# Patient Record
Sex: MALE | Race: WHITE | NOT HISPANIC OR LATINO | Employment: PART TIME | URBAN - METROPOLITAN AREA
[De-identification: names, ages, dates, MRNs, and addresses within clinical notes are randomized per-mention and may not be internally consistent; named-entity substitution may affect disease eponyms.]

---

## 2022-08-30 ENCOUNTER — HOSPITAL ENCOUNTER (EMERGENCY)
Facility: HOSPITAL | Age: 27
Discharge: HOME | End: 2022-08-30
Attending: EMERGENCY MEDICINE

## 2022-08-30 VITALS
TEMPERATURE: 98.5 F | RESPIRATION RATE: 18 BRPM | HEART RATE: 71 BPM | DIASTOLIC BLOOD PRESSURE: 74 MMHG | SYSTOLIC BLOOD PRESSURE: 136 MMHG | OXYGEN SATURATION: 97 %

## 2022-08-30 DIAGNOSIS — L03.90 CELLULITIS: Primary | ICD-10-CM

## 2022-08-30 PROCEDURE — 99282 EMERGENCY DEPT VISIT SF MDM: CPT

## 2022-08-30 PROCEDURE — 99284 EMERGENCY DEPT VISIT MOD MDM: CPT | Performed by: EMERGENCY MEDICINE

## 2022-08-30 RX ORDER — CLINDAMYCIN HYDROCHLORIDE 150 MG/1
450 CAPSULE ORAL 3 TIMES DAILY
Qty: 63 CAPSULE | Refills: 0 | Status: SHIPPED | OUTPATIENT
Start: 2022-08-30 | End: 2022-09-06

## 2022-08-30 RX ORDER — CLINDAMYCIN HYDROCHLORIDE 150 MG/1
300 CAPSULE ORAL ONCE
Status: DISCONTINUED | OUTPATIENT
Start: 2022-08-30 | End: 2022-08-30

## 2022-08-30 NOTE — ED PROVIDER NOTES
History  Chief Complaint   Patient presents with    Leg Swelling     Pt reports swelling and redness to the right lower leg x3 days      24-year-old male presenting with right-sided anterior leg pain, redness over the last 3 days  Patient states gradual worsening of his symptoms  Patient had similar issue back in February and was diagnosed with cellulitis  His symptoms resolved after taken clindamycin, and he was asymptomatic until today  Patient reports at that time diagnosis he had in his negative ultrasound to rule out a DVT at that time  He denies history of DVT, PE, family history of such, risk factors  Rash  Location:  Leg  Leg rash location:  R lower leg  Quality: painful and redness    Pain details:     Severity:  Mild    Onset quality:  Gradual    Timing:  Constant    Progression:  Worsening  Severity:  Moderate  Onset quality:  Gradual  Timing:  Constant  Progression:  Worsening  Chronicity:  Recurrent  Associated symptoms: no abdominal pain, no diarrhea, no fever, no nausea, no sore throat, not vomiting and not wheezing        None       History reviewed  No pertinent past medical history  History reviewed  No pertinent surgical history  History reviewed  No pertinent family history  I have reviewed and agree with the history as documented  E-Cigarette/Vaping     E-Cigarette/Vaping Substances     Social History     Tobacco Use    Smoking status: Never Smoker    Smokeless tobacco: Never Used   Substance Use Topics    Alcohol use: Never    Drug use: Never       Review of Systems   Constitutional: Negative for chills and fever  HENT: Negative for congestion, nosebleeds, rhinorrhea and sore throat  Eyes: Negative for pain and visual disturbance  Respiratory: Negative for cough and wheezing  Cardiovascular: Negative for chest pain and leg swelling  Gastrointestinal: Negative for abdominal distention, abdominal pain, diarrhea, nausea and vomiting     Genitourinary: Negative for dysuria and frequency  Musculoskeletal: Negative for back pain and joint swelling  Skin: Positive for rash  Negative for wound  Neurological: Negative for weakness and numbness  Psychiatric/Behavioral: Negative for decreased concentration and suicidal ideas  Physical Exam  Physical Exam  Vitals and nursing note reviewed  Constitutional:       Appearance: He is well-developed  HENT:      Head: Normocephalic and atraumatic  Eyes:      Conjunctiva/sclera: Conjunctivae normal       Pupils: Pupils are equal, round, and reactive to light  Neck:      Trachea: No tracheal deviation  Cardiovascular:      Rate and Rhythm: Normal rate and regular rhythm  Heart sounds: Normal heart sounds  No murmur heard  Pulmonary:      Effort: Pulmonary effort is normal  No respiratory distress  Breath sounds: Normal breath sounds  No wheezing or rales  Abdominal:      General: Bowel sounds are normal  There is no distension  Palpations: Abdomen is soft  Tenderness: There is no abdominal tenderness  Musculoskeletal:         General: No deformity  Cervical back: Normal range of motion and neck supple  Comments: Erythema, warmth, tenderness over the anterior right lower leg  No crepitus, pain out of proportion  Neurovascularly intact distally  Compartments soft  Negative Homans  No pain along the deep veins   Skin:     General: Skin is warm and dry  Capillary Refill: Capillary refill takes less than 2 seconds  Neurological:      Mental Status: He is alert and oriented to person, place, and time  Sensory: No sensory deficit     Psychiatric:         Judgment: Judgment normal          Vital Signs  ED Triage Vitals [08/30/22 1632]   Temperature Pulse Respirations Blood Pressure SpO2   98 5 °F (36 9 °C) 71 18 136/74 97 %      Temp Source Heart Rate Source Patient Position - Orthostatic VS BP Location FiO2 (%)   Tympanic Monitor -- Right arm --      Pain Score       -- Vitals:    08/30/22 1632   BP: 136/74   Pulse: 71         Visual Acuity      ED Medications  Medications   clindamycin (CLEOCIN) capsule 300 mg (has no administration in time range)       Diagnostic Studies  Results Reviewed     None                 No orders to display              Procedures  Procedures         ED Course                                             MDM  Number of Diagnoses or Management Options  Cellulitis: new and requires workup  Diagnosis management comments: Anterior leg redness, tenderness and warmth consistent with cellulitis  No crepitus, pain out of proportion, purulence  Will treat as cellulitis with clindamycin since his work from in the past  No systemic signs of illness  Negative Sharona, no DVT risk factors, given patient had negative duplex ultrasound for similar symptoms in the past, and his symptoms resolved after clindamycin, and no pain located around the deep veins I do not think this diagnosis explains the symptoms       Amount and/or Complexity of Data Reviewed  Review and summarize past medical records: yes  Independent visualization of images, tracings, or specimens: yes    Risk of Complications, Morbidity, and/or Mortality  Presenting problems: moderate  Diagnostic procedures: minimal  Management options: moderate        Disposition  Final diagnoses:   Cellulitis     Time reflects when diagnosis was documented in both MDM as applicable and the Disposition within this note     Time User Action Codes Description Comment    8/30/2022  4:43 PM Nneo Nixon Add [L03 [id number]] Cellulitis       ED Disposition     ED Disposition   Discharge    Condition   Stable    Date/Time   Tue Aug 30, 2022  4:43 PM    Dory Leon discharge to home/self care                 Follow-up Information     Follow up With Specialties Details Why Contact Info Additional Information    [address] In 1 day As needed Amanda [address]  [place] [address]  [address] , [address] [facility], [facility], [place], [address], [phone]          Patient's Medications   Discharge Prescriptions    CLINDAMYCIN (CLEOCIN) 150 MG CAPSULE    Take 3 capsules (450 mg total) by mouth 3 (three) times a day for 7 days       Start Date: 8/30/2022 End Date: 9/6/2022       Order Dose: 450 mg       Quantity: 63 capsule    Refills: 0       No discharge procedures on file      PDMP Review     None          ED Provider  Electronically Signed by           Farhan Moss DO  08/30/22 8204